# Patient Record
Sex: FEMALE | Race: OTHER | NOT HISPANIC OR LATINO
[De-identification: names, ages, dates, MRNs, and addresses within clinical notes are randomized per-mention and may not be internally consistent; named-entity substitution may affect disease eponyms.]

---

## 2017-01-27 ENCOUNTER — APPOINTMENT (OUTPATIENT)
Dept: INTERNAL MEDICINE | Facility: CLINIC | Age: 42
End: 2017-01-27

## 2017-04-25 ENCOUNTER — APPOINTMENT (OUTPATIENT)
Dept: ORTHOPEDIC SURGERY | Facility: CLINIC | Age: 42
End: 2017-04-25

## 2017-04-25 VITALS — RESPIRATION RATE: 16 BRPM | WEIGHT: 160 LBS | BODY MASS INDEX: 32.25 KG/M2 | HEIGHT: 59 IN

## 2017-04-25 DIAGNOSIS — G56.01 CARPAL TUNNEL SYNDROME, RIGHT UPPER LIMB: ICD-10-CM

## 2017-04-25 DIAGNOSIS — Z78.9 OTHER SPECIFIED HEALTH STATUS: ICD-10-CM

## 2017-04-25 DIAGNOSIS — M19.041 PRIMARY OSTEOARTHRITIS, RIGHT HAND: ICD-10-CM

## 2017-05-02 ENCOUNTER — APPOINTMENT (OUTPATIENT)
Dept: ORTHOPEDIC SURGERY | Facility: CLINIC | Age: 42
End: 2017-05-02

## 2017-05-02 DIAGNOSIS — M79.605 PAIN IN LEFT LEG: ICD-10-CM

## 2017-05-02 DIAGNOSIS — M79.A21 NONTRAUMATIC COMPARTMENT SYNDROME OF RIGHT LOWER EXTREMITY: ICD-10-CM

## 2017-05-02 DIAGNOSIS — M79.604 PAIN IN RIGHT LEG: ICD-10-CM

## 2017-05-02 DIAGNOSIS — M79.A22 NONTRAUMATIC COMPARTMENT SYNDROME OF LEFT LOWER EXTREMITY: ICD-10-CM

## 2017-05-11 ENCOUNTER — FORM ENCOUNTER (OUTPATIENT)
Age: 42
End: 2017-05-11

## 2017-05-12 ENCOUNTER — APPOINTMENT (OUTPATIENT)
Dept: MRI IMAGING | Facility: CLINIC | Age: 42
End: 2017-05-12

## 2017-05-12 ENCOUNTER — OUTPATIENT (OUTPATIENT)
Dept: OUTPATIENT SERVICES | Facility: HOSPITAL | Age: 42
LOS: 1 days | End: 2017-05-12

## 2017-08-31 ENCOUNTER — APPOINTMENT (OUTPATIENT)
Dept: OPHTHALMOLOGY | Facility: CLINIC | Age: 42
End: 2017-08-31
Payer: COMMERCIAL

## 2017-08-31 PROCEDURE — 92004 COMPRE OPH EXAM NEW PT 1/>: CPT

## 2017-09-07 ENCOUNTER — APPOINTMENT (OUTPATIENT)
Dept: OPHTHALMOLOGY | Facility: CLINIC | Age: 42
End: 2017-09-07
Payer: COMMERCIAL

## 2017-09-07 PROCEDURE — 92012 INTRM OPH EXAM EST PATIENT: CPT

## 2017-10-16 ENCOUNTER — APPOINTMENT (OUTPATIENT)
Dept: INTERNAL MEDICINE | Facility: CLINIC | Age: 42
End: 2017-10-16
Payer: COMMERCIAL

## 2017-10-16 VITALS
HEIGHT: 59 IN | BODY MASS INDEX: 33.06 KG/M2 | TEMPERATURE: 97.8 F | DIASTOLIC BLOOD PRESSURE: 70 MMHG | RESPIRATION RATE: 16 BRPM | WEIGHT: 164 LBS | OXYGEN SATURATION: 99 % | HEART RATE: 68 BPM | SYSTOLIC BLOOD PRESSURE: 120 MMHG

## 2017-10-16 DIAGNOSIS — Z82.49 FAMILY HISTORY OF ISCHEMIC HEART DISEASE AND OTHER DISEASES OF THE CIRCULATORY SYSTEM: ICD-10-CM

## 2017-10-16 PROCEDURE — 93000 ELECTROCARDIOGRAM COMPLETE: CPT

## 2017-10-16 PROCEDURE — 99386 PREV VISIT NEW AGE 40-64: CPT | Mod: 25

## 2017-10-16 PROCEDURE — 36415 COLL VENOUS BLD VENIPUNCTURE: CPT

## 2017-10-18 LAB
25(OH)D3 SERPL-MCNC: 19.2 NG/ML
ALBUMIN SERPL ELPH-MCNC: 3.8 G/DL
ALP BLD-CCNC: 45 U/L
ALT SERPL-CCNC: 18 U/L
ANA SER IF-ACNC: NEGATIVE
ANION GAP SERPL CALC-SCNC: 12 MMOL/L
AST SERPL-CCNC: 17 U/L
BASOPHILS # BLD AUTO: 0.02 K/UL
BASOPHILS NFR BLD AUTO: 0.4 %
BILIRUB SERPL-MCNC: 0.3 MG/DL
BUN SERPL-MCNC: 16 MG/DL
CALCIUM SERPL-MCNC: 9.5 MG/DL
CHLORIDE SERPL-SCNC: 101 MMOL/L
CHOLEST SERPL-MCNC: 125 MG/DL
CHOLEST/HDLC SERPL: 3.5 RATIO
CO2 SERPL-SCNC: 27 MMOL/L
CREAT SERPL-MCNC: 0.8 MG/DL
EOSINOPHIL # BLD AUTO: 0.16 K/UL
EOSINOPHIL NFR BLD AUTO: 2.9 %
ERYTHROCYTE [SEDIMENTATION RATE] IN BLOOD BY WESTERGREN METHOD: 16 MM/HR
GLUCOSE SERPL-MCNC: 116 MG/DL
HCT VFR BLD CALC: 37.6 %
HDLC SERPL-MCNC: 36 MG/DL
HGB BLD-MCNC: 12.1 G/DL
IMM GRANULOCYTES NFR BLD AUTO: 0.2 %
LDLC SERPL CALC-MCNC: 64 MG/DL
LYMPHOCYTES # BLD AUTO: 2.45 K/UL
LYMPHOCYTES NFR BLD AUTO: 45 %
MAN DIFF?: NORMAL
MCHC RBC-ENTMCNC: 32.2 GM/DL
MCHC RBC-ENTMCNC: 32.4 PG
MCV RBC AUTO: 100.8 FL
MONOCYTES # BLD AUTO: 0.59 K/UL
MONOCYTES NFR BLD AUTO: 10.8 %
NEUTROPHILS # BLD AUTO: 2.22 K/UL
NEUTROPHILS NFR BLD AUTO: 40.7 %
PLATELET # BLD AUTO: 337 K/UL
POTASSIUM SERPL-SCNC: 4.3 MMOL/L
PROT SERPL-MCNC: 7.7 G/DL
RBC # BLD: 3.73 M/UL
RBC # FLD: 12.4 %
SODIUM SERPL-SCNC: 140 MMOL/L
TRIGL SERPL-MCNC: 126 MG/DL
TSH SERPL-ACNC: 1.11 UIU/ML
WBC # FLD AUTO: 5.45 K/UL

## 2017-10-24 ENCOUNTER — MESSAGE (OUTPATIENT)
Age: 42
End: 2017-10-24

## 2017-10-24 ENCOUNTER — EMERGENCY (EMERGENCY)
Facility: HOSPITAL | Age: 42
LOS: 1 days | Discharge: HOME | End: 2017-10-24
Admitting: EMERGENCY MEDICINE
Payer: COMMERCIAL

## 2017-10-24 VITALS
SYSTOLIC BLOOD PRESSURE: 148 MMHG | WEIGHT: 164.02 LBS | TEMPERATURE: 99 F | OXYGEN SATURATION: 96 % | HEART RATE: 63 BPM | DIASTOLIC BLOOD PRESSURE: 84 MMHG | RESPIRATION RATE: 18 BRPM
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
## 2017-10-24 DIAGNOSIS — S80.862A INSECT BITE (NONVENOMOUS), LEFT LOWER LEG, INITIAL ENCOUNTER: ICD-10-CM
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
## 2017-10-24 PROCEDURE — 99283 EMERGENCY DEPT VISIT LOW MDM: CPT

## 2017-10-24 RX ORDER — CEPHALEXIN 500 MG
1 CAPSULE ORAL
Qty: 14 | Refills: 0
Start: 2017-10-24 | End: 2017-10-31

## 2017-10-24 NOTE — ED PROVIDER NOTE - OBJECTIVE STATEMENT
41 y/o female c/o an insect bite x2 days. Pt states she felt something stung her leg at night and experienced itching. Bite is located on the leg calf. She scratched it and now notes blistering at the site. She denies: recent camping, joint aches, fever, chills, loss of sensation of her legs.

## 2017-10-24 NOTE — ED PROVIDER NOTE - CARE PLAN
Principal Discharge DX:	Insect bite  Instructions for follow-up, activity and diet:	Please take medication as directed and return to ED if you are experiencing fever

## 2017-10-24 NOTE — ED ADULT NURSE NOTE - OBJECTIVE STATEMENT
Patient to ED alert and orientedx3, complaining of insect bite sustained to left posterior tibial region yesterday. "I felt the bug bite immediately and went to see what it was, but nothing was there. It immediately started itching and then turned red." Fluid-filled vesicle noted with redness. Patient denies fever, chills. Pending medical examination. No "bulls-eye" noted; patient denies hiking or outdoors sports recently.

## 2017-10-24 NOTE — ED ADULT TRIAGE NOTE - CHIEF COMPLAINT QUOTE
Pt states she thinks a bug bit her on her L lower leg on Sunday, now having increased redness and swelling to area

## 2017-10-24 NOTE — ED PROVIDER NOTE - MEDICAL DECISION MAKING DETAILS
41 y/o female with bug bit on left lower leg. 2 cm nondemarcated erythematous with blistering. No itching or pain. Pt treated with keflex for possible skin infection. Will return to ED if progressing symptoms.

## 2017-10-24 NOTE — ED PROVIDER NOTE - PHYSICAL EXAMINATION
General: alert and oriented responding appropriately to questions  Cardiac: S1+S2 with no rubs  Lungs: CTA b/l with no wheezing, rales, ronchi, no calf pain  Abdomen: NT/NT, BS x4, no CVAT  Skin: 2.0cm erythema non-demarcated with 2 blisters in center

## 2018-07-24 ENCOUNTER — APPOINTMENT (OUTPATIENT)
Dept: OPHTHALMOLOGY | Facility: CLINIC | Age: 43
End: 2018-07-24
Payer: COMMERCIAL

## 2018-07-24 DIAGNOSIS — H04.123 DRY EYE SYNDROME OF BILATERAL LACRIMAL GLANDS: ICD-10-CM

## 2018-07-24 DIAGNOSIS — S05.01XA INJURY OF CONJUNCTIVA AND CORNEAL ABRASION W/OUT FOREIGN BODY, RIGHT EYE, INITIAL ENCOUNTER: ICD-10-CM

## 2018-07-24 DIAGNOSIS — H15.102 UNSPECIFIED EPISCLERITIS, LEFT EYE: ICD-10-CM

## 2018-07-24 PROCEDURE — 92012 INTRM OPH EXAM EST PATIENT: CPT

## 2018-08-22 ENCOUNTER — APPOINTMENT (OUTPATIENT)
Dept: OPHTHALMOLOGY | Facility: CLINIC | Age: 43
End: 2018-08-22
Payer: COMMERCIAL

## 2018-08-22 DIAGNOSIS — Z98.890 OTHER SPECIFIED POSTPROCEDURAL STATES: ICD-10-CM

## 2018-08-22 DIAGNOSIS — H15.103 UNSPECIFIED EPISCLERITIS, BILATERAL: ICD-10-CM

## 2018-08-22 PROCEDURE — 92014 COMPRE OPH EXAM EST PT 1/>: CPT

## 2018-09-04 ENCOUNTER — TRANSCRIPTION ENCOUNTER (OUTPATIENT)
Age: 43
End: 2018-09-04

## 2018-09-21 ENCOUNTER — OUTPATIENT (OUTPATIENT)
Dept: OUTPATIENT SERVICES | Facility: HOSPITAL | Age: 43
LOS: 1 days | End: 2018-09-21
Payer: COMMERCIAL

## 2018-09-21 ENCOUNTER — APPOINTMENT (OUTPATIENT)
Dept: SURGERY | Facility: CLINIC | Age: 43
End: 2018-09-21

## 2018-09-21 DIAGNOSIS — Z00.00 ENCOUNTER FOR GENERAL ADULT MEDICAL EXAMINATION WITHOUT ABNORMAL FINDINGS: ICD-10-CM

## 2018-09-21 PROCEDURE — 71046 X-RAY EXAM CHEST 2 VIEWS: CPT | Mod: 26

## 2018-10-31 ENCOUNTER — APPOINTMENT (OUTPATIENT)
Dept: OPHTHALMOLOGY | Facility: CLINIC | Age: 43
End: 2018-10-31

## 2018-12-03 ENCOUNTER — APPOINTMENT (OUTPATIENT)
Dept: ORTHOPEDIC SURGERY | Facility: CLINIC | Age: 43
End: 2018-12-03

## 2019-03-04 ENCOUNTER — TRANSCRIPTION ENCOUNTER (OUTPATIENT)
Age: 44
End: 2019-03-04

## 2019-08-07 ENCOUNTER — FORM ENCOUNTER (OUTPATIENT)
Age: 44
End: 2019-08-07

## 2019-08-08 ENCOUNTER — OUTPATIENT (OUTPATIENT)
Dept: OUTPATIENT SERVICES | Facility: HOSPITAL | Age: 44
LOS: 1 days | End: 2019-08-08
Payer: COMMERCIAL

## 2019-08-08 PROCEDURE — 72020 X-RAY EXAM OF SPINE 1 VIEW: CPT | Mod: 26

## 2019-08-08 PROCEDURE — 72020 X-RAY EXAM OF SPINE 1 VIEW: CPT

## 2019-08-08 PROCEDURE — 73521 X-RAY EXAM HIPS BI 2 VIEWS: CPT | Mod: 26

## 2019-08-08 PROCEDURE — 73521 X-RAY EXAM HIPS BI 2 VIEWS: CPT

## 2019-08-09 ENCOUNTER — OTHER (OUTPATIENT)
Age: 44
End: 2019-08-09

## 2019-08-09 DIAGNOSIS — M16.0 BILATERAL PRIMARY OSTEOARTHRITIS OF HIP: ICD-10-CM

## 2019-08-22 ENCOUNTER — APPOINTMENT (OUTPATIENT)
Dept: ORTHOPEDIC SURGERY | Facility: CLINIC | Age: 44
End: 2019-08-22
Payer: COMMERCIAL

## 2019-08-22 VITALS — HEIGHT: 59 IN | BODY MASS INDEX: 30.24 KG/M2 | WEIGHT: 150 LBS

## 2019-08-22 DIAGNOSIS — M76.899 OTHER SPECIFIED ENTHESOPATHIES OF UNSPECIFIED LOWER LIMB, EXCLUDING FOOT: ICD-10-CM

## 2019-08-22 PROCEDURE — 99213 OFFICE O/P EST LOW 20 MIN: CPT

## 2019-08-22 RX ORDER — FLUOROMETHOLONE 1 MG/ML
0.1 SOLUTION/ DROPS OPHTHALMIC
Qty: 1 | Refills: 0 | Status: DISCONTINUED | COMMUNITY
Start: 2017-08-31 | End: 2019-08-22

## 2019-08-22 NOTE — END OF VISIT
[FreeTextEntry3] : All medical record entries made by Patience Huerta acting as a scribe for the performing provider (Daryn Guadalupe MD and/or DUSTIN Luna) on 08/22/2019. All entries were dictated to me by the performing medical provider. In signing this record, the medical provider affirms that they have personally performed the history, physical exam, assessment and plan and have also directed, reviewed and agreed to the documentation in the chart.

## 2019-08-22 NOTE — PHYSICAL EXAM
[de-identified] : X-ray imaging of the AP pelvis and bilateral hips done here today demonstrates minimal degenerative changes, mild degeneration of the inferior aspect of the sacroiliac joints bilaterally [de-identified] : Constitutional: Well appearing. No acute distress.\par Mental Status: Alert & oriented to person, place and time. Normal affect.\par Pulmonary: No respiratory distress. Normal chest excursion.\par \par Gait: Normal.\par Ambulatory assist devices: None.\par \par Cervical spine: Skin intact. No visible deformity. Painless active ROM without evident restriction.\par Bilateral upper extremities: Skin intact. No deformity. Painless active ROM without evident restriction.\par Thoracolumbar spine: No deformity. No tenderness. No radicular pain on passive straight leg raise bilaterally.\par \par Pelvis: No pelvic obliquity. No tenderness.\par Leg lengths: Equal.\par \par Right Hip:\par Skin intact. No surgical scars. No erythema. No ecchymosis. No swelling. No deformity. No focal tenderness.\par Mild discomfort with ROM from full extension to 120 degrees of flexion. 30 degrees of internal rotation. 65 degrees of external rotation. 70 degrees of abduction. 15-20 degrees of adduction.\par No crepitation. No instability.\par NO painless. Impingement (FADIR) painless. Stinchfield with very mild discomfort.\par Flexor power 5/5.\par \par Left Hip:\par Skin intact. No surgical scars. No erythema. No ecchymosis. No swelling. No deformity. No focal tenderness.\par Painless ROM from full extension to 120 degrees of flexion. 30 degrees of internal rotation. 60 degrees of external rotation. 70 degrees of abduction. 15 degrees of adduction.\par No crepitation. No instability.\par NO painless. Impingement (FADIR) painless. Stinchfield painless.\par Flexor power 5/5.\par \par Bilateral Knees: Skin intact. No surgical scars. No erythema or ecchymosis. No swelling or effusion. No deformity. Painless and unrestricted range of motion. Central patellar tracking. No crepitation. No instability.\par \par Neurological: Intact distal crude touch sensation. Normal distal motor power.\par Cardiovascular: Palpable dorsalis pedis and posterior tibialis pulses. Brisk capillary refill. No peripheral edema.\par Lymphatics: No peripheral adenopathy appreciated.

## 2019-08-22 NOTE — DISCUSSION/SUMMARY
[de-identified] : Ms. Pantoja is  44 y/o F with bilateral hip pain but minimal degenerative changes on X-ray. We discussed the diagnosis and prognosis of the patient's condition. Non surgical treatment options include physical therapy and low impact exercise, weight loss for those who are overweight, NSAID and analgesic use, joint injections and activity modification including the use of assistive devices. I wrote a prescription for physical therapy. \par Follow up PRN.

## 2019-08-22 NOTE — HISTORY OF PRESENT ILLNESS
[___ mths] : [unfilled] month(s) ago [Intermit.] : ~He/She~ states the symptoms seem to be intermittent [NSAIDs] : relieved by nonsteroidal anti-inflammatory drugs [None] : No exacerbating factors are noted [de-identified] : 43 year old F presents today for initial evaluation of bilateral hip pain that began a few months ago. She reports mild right hip pain and slight left hip pain localized to the side of the hip that occurs when transitioning between sitting and standing. The pain occurs weekly. Patient can walk an unlimited distance unassisted and can use stairs normally. She can sit comfortably in an ordinary chair and can enter public transportation. She takes Aleve for pain PRN.\par Of note, patient reports that she's been trying to lose weight and has lost a few pounds so far.

## 2019-08-29 ENCOUNTER — RESULT REVIEW (OUTPATIENT)
Age: 44
End: 2019-08-29

## 2019-11-26 ENCOUNTER — MEDICATION RENEWAL (OUTPATIENT)
Age: 44
End: 2019-11-26

## 2019-11-26 RX ORDER — FLUOROMETHOLONE 1 MG/ML
0.1 SOLUTION/ DROPS OPHTHALMIC 3 TIMES DAILY
Qty: 1 | Refills: 1 | Status: ACTIVE | COMMUNITY
Start: 2019-11-26 | End: 1900-01-01

## 2020-01-02 ENCOUNTER — RX RENEWAL (OUTPATIENT)
Age: 45
End: 2020-01-02

## 2020-01-03 ENCOUNTER — APPOINTMENT (OUTPATIENT)
Dept: OPHTHALMOLOGY | Facility: CLINIC | Age: 45
End: 2020-01-03
Payer: COMMERCIAL

## 2020-01-03 ENCOUNTER — NON-APPOINTMENT (OUTPATIENT)
Age: 45
End: 2020-01-03

## 2020-01-03 PROCEDURE — 92014 COMPRE OPH EXAM EST PT 1/>: CPT

## 2020-01-22 ENCOUNTER — APPOINTMENT (OUTPATIENT)
Dept: OPHTHALMOLOGY | Facility: CLINIC | Age: 45
End: 2020-01-22
Payer: COMMERCIAL

## 2020-01-22 ENCOUNTER — NON-APPOINTMENT (OUTPATIENT)
Age: 45
End: 2020-01-22

## 2020-01-22 PROCEDURE — 92014 COMPRE OPH EXAM EST PT 1/>: CPT

## 2020-03-10 ENCOUNTER — APPOINTMENT (OUTPATIENT)
Dept: INTERNAL MEDICINE | Facility: CLINIC | Age: 45
End: 2020-03-10

## 2020-04-26 ENCOUNTER — MESSAGE (OUTPATIENT)
Age: 45
End: 2020-04-26

## 2020-05-15 ENCOUNTER — APPOINTMENT (OUTPATIENT)
Dept: DISASTER EMERGENCY | Facility: CLINIC | Age: 45
End: 2020-05-15

## 2020-05-15 LAB
SARS-COV-2 IGG SERPL IA-ACNC: <0.1 INDEX
SARS-COV-2 IGG SERPL QL IA: NEGATIVE

## 2020-10-12 ENCOUNTER — NON-APPOINTMENT (OUTPATIENT)
Age: 45
End: 2020-10-12

## 2020-10-12 ENCOUNTER — APPOINTMENT (OUTPATIENT)
Dept: OPHTHALMOLOGY | Facility: CLINIC | Age: 45
End: 2020-10-12
Payer: COMMERCIAL

## 2020-10-12 PROCEDURE — 92012 INTRM OPH EXAM EST PATIENT: CPT

## 2020-11-04 ENCOUNTER — APPOINTMENT (OUTPATIENT)
Dept: OBGYN | Facility: CLINIC | Age: 45
End: 2020-11-04
Payer: COMMERCIAL

## 2020-11-04 ENCOUNTER — TRANSCRIPTION ENCOUNTER (OUTPATIENT)
Age: 45
End: 2020-11-04

## 2020-11-04 VITALS — WEIGHT: 165.31 LBS | HEIGHT: 59 IN | BODY MASS INDEX: 33.32 KG/M2

## 2020-11-04 DIAGNOSIS — Z12.4 ENCOUNTER FOR SCREENING FOR MALIGNANT NEOPLASM OF CERVIX: ICD-10-CM

## 2020-11-04 DIAGNOSIS — R23.2 FLUSHING: ICD-10-CM

## 2020-11-04 DIAGNOSIS — R92.2 INCONCLUSIVE MAMMOGRAM: ICD-10-CM

## 2020-11-04 PROCEDURE — 99396 PREV VISIT EST AGE 40-64: CPT

## 2020-11-04 PROCEDURE — 99072 ADDL SUPL MATRL&STAF TM PHE: CPT

## 2020-11-04 RX ORDER — LIDOCAINE AND PRILOCAINE 25; 25 MG/G; MG/G
2.5-2.5 CREAM TOPICAL
Qty: 1 | Refills: 0 | Status: DISCONTINUED | COMMUNITY
Start: 2020-01-02 | End: 2020-11-04

## 2020-11-05 ENCOUNTER — TRANSCRIPTION ENCOUNTER (OUTPATIENT)
Age: 45
End: 2020-11-05

## 2020-11-05 LAB — TSH SERPL-ACNC: 0.91 UIU/ML

## 2020-11-05 RX ORDER — PREDNISOLONE ACETATE 10 MG/ML
1 SUSPENSION/ DROPS OPHTHALMIC
Qty: 5 | Refills: 0 | Status: ACTIVE | COMMUNITY
Start: 2020-10-12

## 2020-11-05 NOTE — PLAN
[FreeTextEntry1] : Annual gyn care\par pap with hpv\par ordered breast ultrasound and mammogram for screening given breast denseness \par \par Hot flashes - will check TSH\par \par Answered questions\par \par Maxine Schultz MD, PhD\par

## 2020-11-05 NOTE — HISTORY OF PRESENT ILLNESS
[Patient reported mammogram was normal] : Patient reported mammogram was normal [Patient reported PAP Smear was normal] : Patient reported PAP Smear was normal [N] : Patient reports normal menses [Condoms] : uses condoms [Y] : Patient is sexually active [TextBox_4] : Ms Pantoja is a 44 yo  who presents for annual gynecological care\par \par Reviewed medical, surgical and family history\par \par History of HPV and colposcopy about 2 years ago - most recent pap test was normal\par Menses regular - with No IMB\par sexually active using condoms\par \par only complaint -- always feels warmer than everyone else\par No change in bowel or bladder habits [Mammogramdate] : 2019 [TextBox_19] : had recommended 6 mo f/up for dense breasts - but did not do [PapSmeardate] : 2019 [TextBox_31] : history of HPV/colposcopy 2017 [LMPDate] : 10/11/20 [MensesFreq] : monthly [MensesLength] : 5-6 [MensesAmount] : moderate [PGxTotal] : 1 [[facility] CenterxFulerm] : 1 [PGHxPremature] : 0 [PGHxAbortions] : 0 [[facility] Centeriving] : 1 [FreeTextEntry1] : c-sxblaine

## 2020-11-05 NOTE — REVIEW OF SYSTEMS
[Fever] : no fever [Fatigue] : no fatigue [Night Sweats] : night sweats [Dry Eyes] : no dry eyes [Eye Discharge] : no eye discharge [Sight Problems] : no sight problems [Dyspnea] : no dyspnea [Cough] : no cough [SOB on Exertion] : no shortness of breath on exertion [Chest Pain] : no chest pain [Palpitations] : no palpitations [Abdominal Pain] : no abdominal pain [Constipation] : no constipation [Diarrhea] : diarrhea [Urgency] : no urgency [Frequency] : no frequency [Incontinence] : no incontinence [Dysuria] : no dysuria [Urethral Discharge] : no urethral discharge [Abn Vaginal bleeding] : no abnormal vaginal bleeding [Pelvic pain] : no pelvic pain [CVA Pain] : no CVA pain [Genital Rash/Irritation] : no genital rash/irritation [Skin Rash] : no skin rash [Mole Changes] : no mole changes [Joint Stiffness] : no joint stiffness [Joint Swelling] : no joint swelling [Headache] : no headache [Dizziness] : no dizziness [Anxiety] : no anxiety [Depression] : no depression [Deepening Voice] : no deepening voice [Feeling Weak] : not feeling weak [Hot Flashes] : hot flashes [Easy Bleeding] : no easy bleeding [Easy Bruising] : no easy bruising [Negative] : Heme/Lymph

## 2020-11-05 NOTE — PHYSICAL EXAM
[Appropriately responsive] : appropriately responsive [Alert] : alert [No Acute Distress] : no acute distress [No Lymphadenopathy] : no lymphadenopathy [Soft] : soft [Non-tender] : non-tender [Non-distended] : non-distended [No HSM] : No HSM [No Mass] : no mass [Oriented x3] : oriented x3 [FreeTextEntry3] : no masses/nodules [FreeTextEntry6] : Examined sitting and recumbent - scattered dense breast tissue [Examination Of The Breasts] : a normal appearance [Breast Palpation Diffuse Fibrous Tissue Bilateral] : fibrocystic changes [No Discharge] : no discharge [No Masses] : no breast masses were palpable [No Lesions] : no lesions  [Labia Majora] : normal [Labia Minora] : normal [Pink Rugae] : pink rugae [No Bleeding] : There was no active vaginal bleeding [Normal] : normal [Normal Position] : in a normal position [Tenderness] : nontender [Enlarged ___ wks] : not enlarged [Mass ___ cm] : no uterine mass was palpated [Uterine Adnexae] : normal [FreeTextEntry5] : no masses/polyps/cmt [FreeTextEntry9] : deferred [FreeTextEntry8] : no pelvic masses

## 2020-11-06 LAB — HPV HIGH+LOW RISK DNA PNL CVX: NOT DETECTED

## 2020-11-09 ENCOUNTER — TRANSCRIPTION ENCOUNTER (OUTPATIENT)
Age: 45
End: 2020-11-09

## 2020-11-09 LAB — CYTOLOGY CVX/VAG DOC THIN PREP: NORMAL

## 2020-11-12 ENCOUNTER — APPOINTMENT (OUTPATIENT)
Dept: OPHTHALMOLOGY | Facility: CLINIC | Age: 45
End: 2020-11-12
Payer: COMMERCIAL

## 2020-11-12 ENCOUNTER — NON-APPOINTMENT (OUTPATIENT)
Age: 45
End: 2020-11-12

## 2020-11-12 PROCEDURE — 92012 INTRM OPH EXAM EST PATIENT: CPT

## 2020-11-12 PROCEDURE — 99072 ADDL SUPL MATRL&STAF TM PHE: CPT

## 2020-12-01 ENCOUNTER — APPOINTMENT (OUTPATIENT)
Dept: INTERNAL MEDICINE | Facility: CLINIC | Age: 45
End: 2020-12-01

## 2021-01-18 ENCOUNTER — EMERGENCY (EMERGENCY)
Facility: HOSPITAL | Age: 46
LOS: 1 days | Discharge: HOME | End: 2021-01-18
Attending: EMERGENCY MEDICINE | Admitting: EMERGENCY MEDICINE
Payer: COMMERCIAL

## 2021-01-18 VITALS
SYSTOLIC BLOOD PRESSURE: 133 MMHG | TEMPERATURE: 101 F | DIASTOLIC BLOOD PRESSURE: 83 MMHG | HEART RATE: 109 BPM | OXYGEN SATURATION: 99 % | RESPIRATION RATE: 18 BRPM

## 2021-01-18 VITALS
TEMPERATURE: 100 F | SYSTOLIC BLOOD PRESSURE: 120 MMHG | DIASTOLIC BLOOD PRESSURE: 74 MMHG | RESPIRATION RATE: 16 BRPM | HEART RATE: 90 BPM | OXYGEN SATURATION: 100 %

## 2021-01-18 DIAGNOSIS — Z20.822 CONTACT WITH AND (SUSPECTED) EXPOSURE TO COVID-19: ICD-10-CM

## 2021-01-18 DIAGNOSIS — M79.18 MYALGIA, OTHER SITE: ICD-10-CM

## 2021-01-18 DIAGNOSIS — R50.9 FEVER, UNSPECIFIED: ICD-10-CM

## 2021-01-18 DIAGNOSIS — R09.81 NASAL CONGESTION: ICD-10-CM

## 2021-01-18 DIAGNOSIS — J02.9 ACUTE PHARYNGITIS, UNSPECIFIED: ICD-10-CM

## 2021-01-18 LAB
S PYO AG SPEC QL IA: POSITIVE
SARS-COV-2 RNA SPEC QL NAA+PROBE: SIGNIFICANT CHANGE UP

## 2021-01-18 PROCEDURE — U0003: CPT

## 2021-01-18 PROCEDURE — 99284 EMERGENCY DEPT VISIT MOD MDM: CPT

## 2021-01-18 PROCEDURE — 87880 STREP A ASSAY W/OPTIC: CPT

## 2021-01-18 PROCEDURE — 99283 EMERGENCY DEPT VISIT LOW MDM: CPT

## 2021-01-18 PROCEDURE — U0005: CPT

## 2021-01-18 RX ORDER — AMOXICILLIN 250 MG/5ML
1 SUSPENSION, RECONSTITUTED, ORAL (ML) ORAL
Qty: 20 | Refills: 0
Start: 2021-01-18 | End: 2021-01-27

## 2021-01-18 RX ORDER — ACETAMINOPHEN 500 MG
975 TABLET ORAL ONCE
Refills: 0 | Status: COMPLETED | OUTPATIENT
Start: 2021-01-18 | End: 2021-01-18

## 2021-01-18 RX ADMIN — Medication 975 MILLIGRAM(S): at 07:58

## 2021-01-18 NOTE — ED PROVIDER NOTE - OBJECTIVE STATEMENT
50 yo f with no pmh c/o sore throat, nasal congestion, bodyaches x 3 days. Associated with subjective fever and chills. Denies ha, dizziness, n/v/d, cough, cp, sob, anosmia. Denies sick contacts or recent travel.

## 2021-01-18 NOTE — ED PROVIDER NOTE - ADDITIONAL RISK FACTOR FREE TEXT BOX
50 yo f with no pmh c/o sore throat, nasal congestion, bodyaches x 3 days. Associated with subjective fever and chills. Denies ha, dizziness, n/v/d, cough, cp, sob, anosmia. Denies sick contacts or recent travel. +pharyngeal erythema, no exudates, uvula midline. +rapid strep. 52 yo f with no pmh c/o sore throat, nasal  congestion, bodyaches x 3 days. Associated with subjective fever and chills. Denies ha, dizziness, n/v/d, cough, cp, sob, anosmia. Denies sick contacts or recent travel. +pharyngeal erythema, no exudates, uvula midline. +rapid strep. 52 yo f with no pmh c/o sore throat, nasal  congestion, bodyaches x 3 days. Associated with subjective fever and chills. Denies ha, dizziness, n/v/d, cough, cp, sob, anosmia. Denies sick contacts or recent travel. +pharyngeal erythema, no exudates, uvula midline. +rapid strep. dc with abx

## 2021-01-18 NOTE — ED PROVIDER NOTE - ATTENDING CONTRIBUTION TO CARE
45 , no PMH , sore throat , myalgias, fever , no cough, no CP no SOB, no sick contacts, no covid exposure,  febrile 100.8 , + erythema, no exudates,   + strep positive, pt non toxic  d/c on antibiotics,   also covid swab pending   emergent return instructions were discussed with patient 45 , no PMH , sore throat , myalgias, fever , no cough, no CP no SOB, no sick contacts, no covid exposure,  febrile 100.8 , + erythema, no exudates,   + strep positive, pt non toxic  d/c on antibiotics,   also covid swab pending   emergent return instructions were discussed with  patient

## 2021-01-18 NOTE — ED PROVIDER NOTE - PHYSICAL EXAMINATION
CONSTITUTIONAL: Well-appearing; well-nourished; in no apparent distress.   HEAD: Normocephalic; atraumatic.   EYES: PERRL; EOM intact; conjunctiva and sclera clear  ENT: +pharyngeal erythema, no exudates, uvula midline  NECK: Supple; +tender cervical LAD   CARDIOVASCULAR: Normal S1, S2; no murmurs, rubs, or gallops. Regular rate and rhythm.   RESPIRATORY: Breathing easily; breath sounds clear and equal bilaterally; no wheezes, rhonchi, or rales.  MSK: FROM at all extremities, normal tone   EXT: No cyanosis or edema; N/V intact  SKIN: Normal for age and race; warm; dry; good turgor; no apparent lesions or rash. CONSTITUTIONAL: Well-appearing; well-nourished; in no apparent distress.   HEAD: Normocephalic; atraumatic.   EYES: PERRL; EOM intact; conjunctiva and sclera clear  ENT: +pharyngeal erythema, no exudates, uvula midline  NECK: Supple; +tender cervical LAD   CARDIOVASCULAR: Normal S1, S2; no murmurs, rubs, or gallops. Regular rate and rhythm.   RESPIRATORY: Breathing easily; breath sounds clear and equal bilaterally; no wheezes, rhonchi, or rales.  MSK: FROM at all extremities, normal tone   EXT: No cyanosis or edema; N/V intact  SKIN: Normal for age and race; warm; dry; good turgor; no apparent lesions or rash .

## 2021-01-18 NOTE — ED PROVIDER NOTE - NS ED ROS FT
Constitutional: +fever, chills    All other ROS neg except as per HPI Constitutional: +fever, chills    All other ROS neg except as per  HPI

## 2021-01-18 NOTE — ED PROVIDER NOTE - PATIENT PORTAL LINK FT
You can access the FollowMyHealth Patient Portal offered by [facility] by registering at the following website: [email or web address]. By joining [facility]’s FollowMyHealth portal, you will also be able to view your health information using other applications (apps) compatible with our system.

## 2021-02-11 ENCOUNTER — APPOINTMENT (OUTPATIENT)
Dept: OPHTHALMOLOGY | Facility: CLINIC | Age: 46
End: 2021-02-11

## 2021-03-17 ENCOUNTER — APPOINTMENT (OUTPATIENT)
Dept: ORTHOPEDIC SURGERY | Facility: CLINIC | Age: 46
End: 2021-03-17
Payer: SUBSIDIZED

## 2021-03-17 DIAGNOSIS — Z00.6 ENCOUNTER FOR EXAMINATION FOR NORMAL COMPARISON AND CONTROL IN CLINICAL RESEARCH PROGRAM: ICD-10-CM

## 2021-03-17 PROCEDURE — ZZZZZ: CPT

## 2021-03-24 PROBLEM — Z00.6 RESEARCH SUBJECT: Status: ACTIVE | Noted: 2021-03-24

## 2021-06-16 ENCOUNTER — APPOINTMENT (OUTPATIENT)
Dept: ORTHOPEDIC SURGERY | Facility: CLINIC | Age: 46
End: 2021-06-16
Payer: SUBSIDIZED

## 2021-06-16 PROCEDURE — ZZZZZ: CPT

## 2021-09-15 ENCOUNTER — LABORATORY RESULT (OUTPATIENT)
Age: 46
End: 2021-09-15

## 2021-09-15 ENCOUNTER — APPOINTMENT (OUTPATIENT)
Dept: INTERNAL MEDICINE | Facility: CLINIC | Age: 46
End: 2021-09-15
Payer: COMMERCIAL

## 2021-09-15 VITALS
SYSTOLIC BLOOD PRESSURE: 120 MMHG | WEIGHT: 166 LBS | RESPIRATION RATE: 16 BRPM | OXYGEN SATURATION: 98 % | BODY MASS INDEX: 33.47 KG/M2 | DIASTOLIC BLOOD PRESSURE: 80 MMHG | TEMPERATURE: 98.4 F | HEIGHT: 59 IN | HEART RATE: 62 BPM

## 2021-09-15 PROCEDURE — 36415 COLL VENOUS BLD VENIPUNCTURE: CPT

## 2021-09-15 PROCEDURE — 99386 PREV VISIT NEW AGE 40-64: CPT

## 2021-09-15 PROCEDURE — 93000 ELECTROCARDIOGRAM COMPLETE: CPT

## 2021-09-15 NOTE — PLAN
[FreeTextEntry1] : Wellness complete\par labs today\par ekg NSR\par  encourage more regular exercise\par mammo annually\par  colonoscopy for next year.

## 2021-09-15 NOTE — HISTORY OF PRESENT ILLNESS
[de-identified] : 46 yo f here for wellness\par \par still working at [employer] at [employer]\par gyn annually\par mammo 11/20\par \par menstruating regularly.\par good sleep\par not that active \par recently finished school\par eats a healthy diet.

## 2021-09-16 LAB
ALBUMIN SERPL ELPH-MCNC: 4.3 G/DL
ALP BLD-CCNC: 47 U/L
ALT SERPL-CCNC: 34 U/L
ANION GAP SERPL CALC-SCNC: 12 MMOL/L
AST SERPL-CCNC: 25 U/L
BASOPHILS # BLD AUTO: 0.04 K/UL
BASOPHILS NFR BLD AUTO: 0.7 %
BILIRUB SERPL-MCNC: 0.4 MG/DL
BUN SERPL-MCNC: 18 MG/DL
CALCIUM SERPL-MCNC: 9.2 MG/DL
CHLORIDE SERPL-SCNC: 101 MMOL/L
CHOLEST SERPL-MCNC: 118 MG/DL
CO2 SERPL-SCNC: 24 MMOL/L
CREAT SERPL-MCNC: 0.79 MG/DL
EOSINOPHIL # BLD AUTO: 0.13 K/UL
EOSINOPHIL NFR BLD AUTO: 2.4 %
ESTIMATED AVERAGE GLUCOSE: 120 MG/DL
FSH SERPL-MCNC: 19.3 IU/L
GLUCOSE SERPL-MCNC: 114 MG/DL
HBA1C MFR BLD HPLC: 5.8 %
HCT VFR BLD CALC: 39.4 %
HDLC SERPL-MCNC: 36 MG/DL
HGB BLD-MCNC: 12.5 G/DL
IMM GRANULOCYTES NFR BLD AUTO: 0 %
LDLC SERPL CALC-MCNC: 61 MG/DL
LH SERPL-ACNC: 14.8 IU/L
LYMPHOCYTES # BLD AUTO: 2.61 K/UL
LYMPHOCYTES NFR BLD AUTO: 47.6 %
MAN DIFF?: NORMAL
MCHC RBC-ENTMCNC: 31.7 GM/DL
MCHC RBC-ENTMCNC: 33.6 PG
MCV RBC AUTO: 105.9 FL
MONOCYTES # BLD AUTO: 0.54 K/UL
MONOCYTES NFR BLD AUTO: 9.9 %
NEUTROPHILS # BLD AUTO: 2.16 K/UL
NEUTROPHILS NFR BLD AUTO: 39.4 %
NONHDLC SERPL-MCNC: 81 MG/DL
PLATELET # BLD AUTO: 301 K/UL
POTASSIUM SERPL-SCNC: 4 MMOL/L
PROT SERPL-MCNC: 7.8 G/DL
RBC # BLD: 3.72 M/UL
RBC # FLD: 12.2 %
SODIUM SERPL-SCNC: 137 MMOL/L
TRIGL SERPL-MCNC: 105 MG/DL
TSH SERPL-ACNC: 0.89 UIU/ML
VIT B12 SERPL-MCNC: 466 PG/ML
WBC # FLD AUTO: 5.48 K/UL

## 2021-10-07 ENCOUNTER — NON-APPOINTMENT (OUTPATIENT)
Age: 46
End: 2021-10-07

## 2021-10-13 ENCOUNTER — NON-APPOINTMENT (OUTPATIENT)
Age: 46
End: 2021-10-13

## 2021-10-14 ENCOUNTER — NON-APPOINTMENT (OUTPATIENT)
Age: 46
End: 2021-10-14

## 2021-10-14 VITALS — WEIGHT: 167 LBS | BODY MASS INDEX: 33.73 KG/M2

## 2021-10-15 ENCOUNTER — NON-APPOINTMENT (OUTPATIENT)
Age: 46
End: 2021-10-15

## 2021-11-16 ENCOUNTER — NON-APPOINTMENT (OUTPATIENT)
Age: 46
End: 2021-11-16

## 2021-11-16 VITALS — WEIGHT: 166 LBS | BODY MASS INDEX: 33.53 KG/M2

## 2021-12-14 ENCOUNTER — NON-APPOINTMENT (OUTPATIENT)
Age: 46
End: 2021-12-14

## 2021-12-14 VITALS — WEIGHT: 163 LBS | BODY MASS INDEX: 32.92 KG/M2

## 2022-01-19 LAB
ANION GAP SERPL CALC-SCNC: 12 MMOL/L
BUN SERPL-MCNC: 12 MG/DL
CALCIUM SERPL-MCNC: 9.3 MG/DL
CHLORIDE SERPL-SCNC: 102 MMOL/L
CO2 SERPL-SCNC: 26 MMOL/L
CREAT SERPL-MCNC: 0.74 MG/DL
ESTIMATED AVERAGE GLUCOSE: 114 MG/DL
GLUCOSE SERPL-MCNC: 116 MG/DL
HBA1C MFR BLD HPLC: 5.6 %
POTASSIUM SERPL-SCNC: 4.2 MMOL/L
SODIUM SERPL-SCNC: 140 MMOL/L

## 2022-08-10 ENCOUNTER — APPOINTMENT (OUTPATIENT)
Dept: ORTHOPEDIC SURGERY | Facility: CLINIC | Age: 47
End: 2022-08-10

## 2022-08-10 VITALS — HEIGHT: 59 IN | RESPIRATION RATE: 16 BRPM | BODY MASS INDEX: 30.24 KG/M2 | WEIGHT: 150 LBS

## 2022-08-10 DIAGNOSIS — M77.11 LATERAL EPICONDYLITIS, RIGHT ELBOW: ICD-10-CM

## 2022-08-10 PROCEDURE — 99203 OFFICE O/P NEW LOW 30 MIN: CPT

## 2022-09-15 ENCOUNTER — APPOINTMENT (OUTPATIENT)
Dept: INTERNAL MEDICINE | Facility: CLINIC | Age: 47
End: 2022-09-15

## 2022-09-23 ENCOUNTER — APPOINTMENT (OUTPATIENT)
Dept: INTERNAL MEDICINE | Facility: CLINIC | Age: 47
End: 2022-09-23

## 2022-10-13 ENCOUNTER — NON-APPOINTMENT (OUTPATIENT)
Age: 47
End: 2022-10-13

## 2022-10-13 ENCOUNTER — APPOINTMENT (OUTPATIENT)
Dept: OBGYN | Facility: CLINIC | Age: 47
End: 2022-10-13

## 2022-10-13 VITALS
WEIGHT: 144 LBS | HEIGHT: 59 IN | DIASTOLIC BLOOD PRESSURE: 79 MMHG | SYSTOLIC BLOOD PRESSURE: 113 MMHG | BODY MASS INDEX: 29.03 KG/M2

## 2022-10-13 PROCEDURE — 99396 PREV VISIT EST AGE 40-64: CPT

## 2022-10-13 NOTE — PHYSICAL EXAM
[Chaperone Present] : A chaperone was present in the examining room during all aspects of the physical examination [Appropriately responsive] : appropriately responsive [Alert] : alert [No Acute Distress] : no acute distress [Regular Rate Rhythm] : regular rate rhythm [No Murmurs] : no murmurs [Clear to Auscultation B/L] : clear to auscultation bilaterally [Soft] : soft [Non-tender] : non-tender [Non-distended] : non-distended [No Mass] : no mass [Oriented x3] : oriented x3 [Examination Of The Breasts] : a normal appearance [No Masses] : no breast masses were palpable [Labia Majora] : normal [Labia Minora] : normal [Normal] : normal [Uterine Adnexae] : normal

## 2022-10-13 NOTE — HISTORY OF PRESENT ILLNESS
[N] : Patient is not sexually active [FreeTextEntry1] : 48 yo  who presents for [employer].\par \par Pt here for WWE. Doing well, no complaints. 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 She is not SA, had one prior CD. Overdue for MMG and pap. Desires hormonal testing to see if close to menopause.  [Mammogramdate] : 12/2020 [PapSmeardate] : 12/2020 [LMPDate] : 09/04/2022 [MensesFreq] : 28 [MensesLength] : 5 [PGxTotal] : 1 [[facility] Hospitaliving] : 1

## 2022-10-13 NOTE — PLAN
[FreeTextEntry1] : 46 yo  who presents for [employer].\par \par - Normal breast and pelvic exam\par - Pap collected\par - Pt desires STI screening\par - Hormone panel, discussed perimenopause\par - MMG and breast sono Rx\par \par RTO in 1 year or PRN.\par \par Vicky Carpenter MD

## 2022-10-13 NOTE — COUNSELING
[Bladder Hygiene] : bladder hygiene [Lab Results] : lab results [Medication Management] : medication management

## 2022-10-14 ENCOUNTER — TRANSCRIPTION ENCOUNTER (OUTPATIENT)
Age: 47
End: 2022-10-14

## 2022-10-14 LAB
C TRACH RRNA SPEC QL NAA+PROBE: NOT DETECTED
FSH SERPL-MCNC: 10.3 IU/L
HBV SURFACE AG SER QL: NONREACTIVE
HIV1+2 AB SPEC QL IA.RAPID: NONREACTIVE
HPV HIGH+LOW RISK DNA PNL CVX: NOT DETECTED
N GONORRHOEA RRNA SPEC QL NAA+PROBE: NOT DETECTED
SOURCE AMPLIFICATION: NORMAL
T PALLIDUM AB SER QL IA: NEGATIVE
TSH SERPL-ACNC: 0.51 UIU/ML

## 2022-10-19 ENCOUNTER — TRANSCRIPTION ENCOUNTER (OUTPATIENT)
Age: 47
End: 2022-10-19

## 2022-10-19 LAB — CYTOLOGY CVX/VAG DOC THIN PREP: NORMAL

## 2022-10-21 ENCOUNTER — TRANSCRIPTION ENCOUNTER (OUTPATIENT)
Age: 47
End: 2022-10-21

## 2022-10-21 LAB — ANTI-MUELLERIAN HORMONE: 0.02 NG/ML

## 2022-10-31 ENCOUNTER — OUTPATIENT (OUTPATIENT)
Dept: OUTPATIENT SERVICES | Facility: HOSPITAL | Age: 47
LOS: 1 days | End: 2022-10-31
Payer: COMMERCIAL

## 2022-10-31 ENCOUNTER — TRANSCRIPTION ENCOUNTER (OUTPATIENT)
Age: 47
End: 2022-10-31

## 2022-10-31 ENCOUNTER — RESULT REVIEW (OUTPATIENT)
Age: 47
End: 2022-10-31

## 2022-10-31 ENCOUNTER — APPOINTMENT (OUTPATIENT)
Dept: MAMMOGRAPHY | Facility: HOSPITAL | Age: 47
End: 2022-10-31

## 2022-10-31 ENCOUNTER — APPOINTMENT (OUTPATIENT)
Dept: ULTRASOUND IMAGING | Facility: HOSPITAL | Age: 47
End: 2022-10-31

## 2022-10-31 PROCEDURE — 76641 ULTRASOUND BREAST COMPLETE: CPT | Mod: 26,50

## 2022-10-31 PROCEDURE — 77067 SCR MAMMO BI INCL CAD: CPT

## 2022-10-31 PROCEDURE — 77063 BREAST TOMOSYNTHESIS BI: CPT

## 2022-10-31 PROCEDURE — 76641 ULTRASOUND BREAST COMPLETE: CPT

## 2022-10-31 PROCEDURE — 77067 SCR MAMMO BI INCL CAD: CPT | Mod: 26

## 2022-10-31 PROCEDURE — 77063 BREAST TOMOSYNTHESIS BI: CPT | Mod: 26

## 2022-12-25 NOTE — ED ADULT TRIAGE NOTE - NS ED NURSE DIRECT TO ROOM YN
Physician Documentation                                                                           

 [facility]                                                                 

Name: Johnny Davila                                                                             

Age: 40 yrs                                                                                       

Sex: Male                                                                                         

: 1982                                                                                   

MRN: [id number]                                                                                   

Arrival Date: 2022                                                                          

Time: 14:30                                                                                       

Account#: [id number]                                                                            

Bed 12                                                                                            

Private MD:                                                                                       

ED Physician Prakash Villalta                                                                      

HPI:                                                                                              

                                                                                             

17:01 This 40 yrs old Male presents to ER via Ambulatory with complaints of Flu Symptoms.     kb  

17:01 The patient or guardian reports cough, that is intermittent, described as mild, flu     kb  

      symptoms, low-grade fever, myalgias, no appetite. Onset: The symptoms/episode               

      began/occurred this morning, at 09:00. Severity of symptoms: At their worst the             

      symptoms were moderate, in the emergency department the symptoms are unchanged.             

      Modifying factors: The symptoms are alleviated by nothing, the symptoms are aggravated      

      by nothing. Associated signs and symptoms: Pertinent positives: fever. The patient has      

      not experienced similar symptoms in the past. The patient has not recently seen a           

      physician. Pr reports malaise, cough, congestion, fever, chills, bodyaches and headache     

      that started at 0900 this morning.                                                          

                                                                                                  

Historical:                                                                                       

- Allergies:                                                                                      

14:50 No Known Allergies;                                                                     hb  

- PMHx:                                                                                           

14:50 Josiane Dx; Depression;                                                              hb  

- PSHx:                                                                                           

14:50 Carpel Tunnel;                                                                          hb  

                                                                                                  

- Immunization history:: Adult Immunizations up to date.                                          

- Social history:: Smoking status: Patient denies any tobacco usage or history of.                

                                                                                                  

                                                                                                  

ROS:                                                                                              

17:02 Abdomen/GI: Negative for abdominal pain, nausea, vomiting, diarrhea, and constipation.  kb  

17:02 Constitutional: Positive for body aches, chills, fatigue, fever, malaise.                   

17:02 ENT: Positive for sinus congestion.                                                         

17:02 Respiratory: Positive for cough.                                                            

17:02 Neuro: Positive for headache.                                                               

17:02 All other systems are negative.                                                             

                                                                                                  

Exam:                                                                                             

17:02 Constitutional:  This is a well developed, well nourished patient who is awake, alert,  kb  

      and in no acute distress. Head/Face:  Normocephalic, atraumatic. ENT:  Moist Mucous         

      membranes Cardiovascular:  Regular rate and rhythm with a normal S1 and S2.  No             

      gallops, murmurs, or rubs.  No pulse deficits. Respiratory:  Respirations even and          

      unlabored. No increased work of breathing. Talking in full sentences Abdomen/GI:  Soft,     

      non-tender. No distention Skin:  Warm, dry with normal turgor.  Normal color. MS/           

      Extremity:  Pulses equal, no cyanosis.  Neurovascular intact.  Full, normal range of        

      motion. Neuro:  Awake and alert, GCS 15, oriented to person, place, time, and               

      situation. Moves all extremities. Normal gait. Psych:  Awake, alert, with orientation       

      to person, place and time.  Behavior, mood, and affect are within normal limits.            

                                                                                                  

Vital Signs:                                                                                      

14:50  / 79; Pulse 120; Resp 18; Temp 98.4(TE); Pulse Ox 98% ; Weight 86.18 kg; Height  hb  

      5 ft. 6 in. (167.64 cm); Pain 10/10;                                                        

17:08 Pulse 100; Resp 19 S; Temp 99.1(O); Pulse Ox 100% on R/A;                               aa5 

14:50 Body Mass Index 30.67 (86.18 kg, 167.64 cm)                                             hb  

                                                                                                  

MDM:                                                                                              

15:01 Patient medically screened.                                                             kb  

17:00 Data reviewed: vital signs, nurses notes. Data interpreted: Pulse oximetry: on room air kb  

      is 98 %. Interpretation: normal. Counseling: I had a detailed discussion with the           

      patient and/or guardian regarding: the historical points, exam findings, and any            

      diagnostic results supporting the discharge/admit diagnosis, lab results, the need for      

      outpatient follow up, a family practitioner, to return to the emergency department if       

      symptoms worsen or persist or if there are any questions or concerns that arise at home.    

                                                                                                  

                                                                                             

14:51 Order name: COVID-19/FLU A+B; Complete Time: 15:51                                      kb  

                                                                                                  

Administered Medications:                                                                         

No medications were administered                                                                  

                                                                                                  

                                                                                                  

Disposition Summary:                                                                              

22 16:56                                                                                    

Discharge Ordered                                                                                 

      Location: Home                                                                          kb  

      Condition: Stable                                                                       kb  

      Diagnosis                                                                                   

        - Acute upper respiratory infection, unspecified                                      kb  

      Followup:                                                                               kb  

        - With: Emergency Department                                                               

        - When: As needed                                                                          

        - Reason: Worsening of condition                                                           

      Followup:                                                                               kb  

        - With: Private Physician                                                                  

        - When: 2 - 3 days                                                                         

        - Reason: Recheck today's complaints, Continuance of care, Re-evaluation by your           

      physician                                                                                   

      Discharge Instructions:                                                                     

        - Discharge Summary Sheet                                                             kb  

        - Upper Respiratory Infection, Adult, Easy-to-Read                                    kb  

        - Viral Respiratory Infection, Easy-To-Read                                           kb  

      Forms:                                                                                      

        - Medication Reconciliation Form                                                      kb  

        - Thank You Letter                                                                    kb  

        - Antibiotic Education                                                                kb  

        - Prescription Opioid Use                                                             kb  

Signatures:                                                                                       

Dispatcher [employer]                           Diana Cardenas, FNP-C                 FNP-Ckb                                                   

Freya Tavares, RN                     RN   hb                                                   

                                                                                                  

**************************************************************************************************
No

## 2023-06-22 ENCOUNTER — APPOINTMENT (OUTPATIENT)
Dept: INTERNAL MEDICINE | Facility: CLINIC | Age: 48
End: 2023-06-22

## 2023-11-01 ENCOUNTER — APPOINTMENT (OUTPATIENT)
Dept: OBGYN | Facility: CLINIC | Age: 48
End: 2023-11-01
Payer: COMMERCIAL

## 2023-11-01 VITALS
WEIGHT: 150 LBS | OXYGEN SATURATION: 96 % | SYSTOLIC BLOOD PRESSURE: 145 MMHG | BODY MASS INDEX: 30.3 KG/M2 | HEART RATE: 62 BPM | DIASTOLIC BLOOD PRESSURE: 81 MMHG

## 2023-11-01 DIAGNOSIS — Z01.419 ENCOUNTER FOR GYNECOLOGICAL EXAMINATION (GENERAL) (ROUTINE) W/OUT ABNORMAL FINDINGS: ICD-10-CM

## 2023-11-01 PROCEDURE — 99396 PREV VISIT EST AGE 40-64: CPT

## 2023-11-02 ENCOUNTER — TRANSCRIPTION ENCOUNTER (OUTPATIENT)
Age: 48
End: 2023-11-02

## 2023-11-02 LAB
HBV SURFACE AG SER QL: NONREACTIVE
HIV1+2 AB SPEC QL IA.RAPID: NONREACTIVE
T PALLIDUM AB SER QL IA: NEGATIVE

## 2023-11-06 ENCOUNTER — TRANSCRIPTION ENCOUNTER (OUTPATIENT)
Age: 48
End: 2023-11-06

## 2023-11-06 LAB
A VAGINAE DNA VAG QL NAA+PROBE: ABNORMAL
BVAB2 DNA VAG QL NAA+PROBE: NORMAL
C KRUSEI DNA VAG QL NAA+PROBE: NEGATIVE
C KRUSEI DNA VAG QL NAA+PROBE: POSITIVE
C TRACH RRNA SPEC QL NAA+PROBE: NEGATIVE
CANDIDA DNA VAG QL NAA+PROBE: NEGATIVE
CYTOLOGY CVX/VAG DOC THIN PREP: NORMAL
HPV HIGH+LOW RISK DNA PNL CVX: NOT DETECTED
MEGA1 DNA VAG QL NAA+PROBE: NORMAL
N GONORRHOEA RRNA SPEC QL NAA+PROBE: NEGATIVE
T VAGINALIS RRNA SPEC QL NAA+PROBE: NEGATIVE

## 2023-11-08 ENCOUNTER — TRANSCRIPTION ENCOUNTER (OUTPATIENT)
Age: 48
End: 2023-11-08

## 2024-01-23 ENCOUNTER — NON-APPOINTMENT (OUTPATIENT)
Age: 49
End: 2024-01-23

## 2024-01-24 ENCOUNTER — TRANSCRIPTION ENCOUNTER (OUTPATIENT)
Age: 49
End: 2024-01-24

## 2024-01-29 ENCOUNTER — APPOINTMENT (OUTPATIENT)
Dept: OBGYN | Facility: CLINIC | Age: 49
End: 2024-01-29
Payer: COMMERCIAL

## 2024-01-29 PROCEDURE — 36415 COLL VENOUS BLD VENIPUNCTURE: CPT

## 2024-01-30 LAB
FSH SERPL-MCNC: 9.1 IU/L
LH SERPL-ACNC: 15.4 IU/L
TSH SERPL-ACNC: 0.87 UIU/ML

## 2024-02-08 ENCOUNTER — APPOINTMENT (OUTPATIENT)
Dept: FAMILY MEDICINE | Facility: CLINIC | Age: 49
End: 2024-02-08

## 2024-02-14 ENCOUNTER — TRANSCRIPTION ENCOUNTER (OUTPATIENT)
Age: 49
End: 2024-02-14

## 2024-02-28 ENCOUNTER — APPOINTMENT (OUTPATIENT)
Dept: INTERNAL MEDICINE | Facility: CLINIC | Age: 49
End: 2024-02-28
Payer: COMMERCIAL

## 2024-02-28 VITALS
TEMPERATURE: 96.3 F | DIASTOLIC BLOOD PRESSURE: 84 MMHG | SYSTOLIC BLOOD PRESSURE: 127 MMHG | HEART RATE: 55 BPM | OXYGEN SATURATION: 100 % | BODY MASS INDEX: 30.24 KG/M2 | HEIGHT: 59 IN | WEIGHT: 150 LBS

## 2024-02-28 DIAGNOSIS — Z12.39 ENCOUNTER FOR OTHER SCREENING FOR MALIGNANT NEOPLASM OF BREAST: ICD-10-CM

## 2024-02-28 DIAGNOSIS — R73.03 PREDIABETES.: ICD-10-CM

## 2024-02-28 DIAGNOSIS — Z00.00 ENCOUNTER FOR GENERAL ADULT MEDICAL EXAMINATION W/OUT ABNORMAL FINDINGS: ICD-10-CM

## 2024-02-28 PROCEDURE — 99396 PREV VISIT EST AGE 40-64: CPT

## 2024-02-28 PROCEDURE — 36415 COLL VENOUS BLD VENIPUNCTURE: CPT

## 2024-02-28 RX ORDER — POLYETHYLENE GLYCOL 3350 17 G/17G
17 POWDER, FOR SOLUTION ORAL DAILY
Qty: 1 | Refills: 2 | Status: ACTIVE | COMMUNITY
Start: 2024-02-28 | End: 1900-01-01

## 2024-02-28 NOTE — ASSESSMENT
[FreeTextEntry1] : Refusing vaccinations today, risk and benefits explained at, still refusing Pap smear up-to-date

## 2024-02-28 NOTE — HISTORY OF PRESENT ILLNESS
[de-identified] : This is a 48-year-old female with past medical history of epicondylitis and prediabetes who comes to establish care.  Today complains of constipation, chronic issue, has been like that for years she has a bowel movement 2-3 times a week.  No other complaints

## 2024-02-28 NOTE — PHYSICAL EXAM
[de-identified] : Obese [Normal] : no carotid or abdominal bruits heard, no varicosities, pedal pulses are present, no peripheral edema, no extremity clubbing or cyanosis and no palpable aorta

## 2024-02-28 NOTE — REVIEW OF SYSTEMS
[Abdominal Pain] : no abdominal pain [Nausea] : no nausea [Constipation] : constipation [Diarrhea] : diarrhea [Heartburn] : no heartburn [Vomiting] : no vomiting [Negative] : Respiratory

## 2024-02-29 DIAGNOSIS — E55.9 VITAMIN D DEFICIENCY, UNSPECIFIED: ICD-10-CM

## 2024-02-29 LAB
25(OH)D3 SERPL-MCNC: 16.1 NG/ML
ALBUMIN SERPL ELPH-MCNC: 4.1 G/DL
ALP BLD-CCNC: 42 U/L
ALT SERPL-CCNC: 12 U/L
ANION GAP SERPL CALC-SCNC: 11 MMOL/L
AST SERPL-CCNC: 13 U/L
BASOPHILS # BLD AUTO: 0.02 K/UL
BASOPHILS NFR BLD AUTO: 0.4 %
BILIRUB SERPL-MCNC: 0.6 MG/DL
BUN SERPL-MCNC: 20 MG/DL
CALCIUM SERPL-MCNC: 9.1 MG/DL
CHLORIDE SERPL-SCNC: 101 MMOL/L
CHOLEST SERPL-MCNC: 135 MG/DL
CO2 SERPL-SCNC: 23 MMOL/L
CREAT SERPL-MCNC: 0.69 MG/DL
EGFR: 108 ML/MIN/1.73M2
EOSINOPHIL # BLD AUTO: 0.06 K/UL
EOSINOPHIL NFR BLD AUTO: 1.2 %
ESTIMATED AVERAGE GLUCOSE: 117 MG/DL
GLUCOSE SERPL-MCNC: 117 MG/DL
HBA1C MFR BLD HPLC: 5.7 %
HCT VFR BLD CALC: 36.3 %
HDLC SERPL-MCNC: 49 MG/DL
HGB BLD-MCNC: 12.2 G/DL
IMM GRANULOCYTES NFR BLD AUTO: 0.4 %
LDLC SERPL CALC-MCNC: 68 MG/DL
LYMPHOCYTES # BLD AUTO: 2.03 K/UL
LYMPHOCYTES NFR BLD AUTO: 40.8 %
MAN DIFF?: NORMAL
MCHC RBC-ENTMCNC: 32 PG
MCHC RBC-ENTMCNC: 33.6 GM/DL
MCV RBC AUTO: 95.3 FL
MONOCYTES # BLD AUTO: 0.42 K/UL
MONOCYTES NFR BLD AUTO: 8.5 %
NEUTROPHILS # BLD AUTO: 2.42 K/UL
NEUTROPHILS NFR BLD AUTO: 48.7 %
NONHDLC SERPL-MCNC: 86 MG/DL
PLATELET # BLD AUTO: 272 K/UL
POTASSIUM SERPL-SCNC: 4 MMOL/L
PROT SERPL-MCNC: 7.6 G/DL
RBC # BLD: 3.81 M/UL
RBC # FLD: 12.4 %
SODIUM SERPL-SCNC: 136 MMOL/L
TRIGL SERPL-MCNC: 95 MG/DL
WBC # FLD AUTO: 4.97 K/UL

## 2024-02-29 RX ORDER — ERGOCALCIFEROL 1.25 MG/1
1.25 MG CAPSULE, LIQUID FILLED ORAL
Qty: 12 | Refills: 1 | Status: ACTIVE | COMMUNITY
Start: 2024-02-29 | End: 1900-01-01

## 2024-06-13 ENCOUNTER — APPOINTMENT (OUTPATIENT)
Dept: GASTROENTEROLOGY | Facility: CLINIC | Age: 49
End: 2024-06-13
Payer: COMMERCIAL

## 2024-06-13 VITALS
OXYGEN SATURATION: 98 % | TEMPERATURE: 97.2 F | RESPIRATION RATE: 16 BRPM | SYSTOLIC BLOOD PRESSURE: 126 MMHG | BODY MASS INDEX: 31.65 KG/M2 | HEART RATE: 55 BPM | HEIGHT: 59 IN | WEIGHT: 157 LBS | DIASTOLIC BLOOD PRESSURE: 80 MMHG

## 2024-06-13 DIAGNOSIS — E66.9 OBESITY, UNSPECIFIED: ICD-10-CM

## 2024-06-13 DIAGNOSIS — Z12.11 ENCOUNTER FOR SCREENING FOR MALIGNANT NEOPLASM OF COLON: ICD-10-CM

## 2024-06-13 DIAGNOSIS — K59.00 CONSTIPATION, UNSPECIFIED: ICD-10-CM

## 2024-06-13 DIAGNOSIS — R14.2 ERUCTATION: ICD-10-CM

## 2024-06-13 PROCEDURE — 99205 OFFICE O/P NEW HI 60 MIN: CPT

## 2024-06-13 RX ORDER — POLYETHYLENE GLYCOL 3350 AND ELECTROLYTES WITH LEMON FLAVOR 236; 22.74; 6.74; 5.86; 2.97 G/4L; G/4L; G/4L; G/4L; G/4L
236 POWDER, FOR SOLUTION ORAL
Qty: 1 | Refills: 0 | Status: ACTIVE | COMMUNITY
Start: 2024-06-13 | End: 1900-01-01

## 2024-06-13 NOTE — ASSESSMENT
[FreeTextEntry1] : 48F PMHx epicondylitis and prediabetes presenting for colonoscopy screening evaluation.   #Chronic constipation #CRC screening  Here for index colonoscopy evaluation No red flag signs of abdominal pain, unintentional weight loss, bloody or black BMs.  -Will plan for screening colonoscopy screening evaluation with Dr Overton at [facility], Rx for GoLytely sent; R/B/A/L discussed with patient. All questions answered to patient's satisfaction. -Most recent BW reviewed  #Obesity, Class I BMI 31.71  -Encouraged ongoing weight loss efforts with dietary and lifestyle changes -Dietician referral placed  #Belching H pylori breath test today  Kamila Mathur DO Gastroenterology Fellow

## 2024-06-13 NOTE — PHYSICAL EXAM
[Alert] : alert [Normal Voice/Communication] : normal voice/communication [Healthy Appearing] : healthy appearing [No Acute Distress] : no acute distress [Sclera] : the sclera and conjunctiva were normal [Hearing Threshold Finger Rub Not [place]] : hearing was normal [Normal Lips/Gums] : the lips and gums were normal [Oropharynx] : the oropharynx was normal [Normal Appearance] : the appearance of the neck was normal [No Neck Mass] : no neck mass was observed [No Respiratory Distress] : no respiratory distress [No Acc Muscle Use] : no accessory muscle use [Respiration, Rhythm And Depth] : normal respiratory rhythm and effort [Abdomen Tenderness] : non-tender [No Masses] : no abdominal mass palpated [Abdomen Soft] : soft [Cervical Lymph Nodes Enlarged Posterior Bilaterally] : no posterior cervical lymphadenopathy [Supraclavicular Lymph Nodes Enlarged Bilaterally] : no supraclavicular lymphadenopathy [Axillary Lymph Nodes Enlarged Bilaterally] : no axillary lymphadenopathy [No CVA Tenderness] : no CVA  tenderness [No Spinal Tenderness] : no spinal tenderness [Abnormal Walk] : normal gait [No Clubbing, Cyanosis] : no clubbing or cyanosis of the fingernails [No Joint Swelling] : no joint swelling seen [Normal Color / Pigmentation] : normal skin color and pigmentation [] : no rash [Cranial Nerves Intact] : cranial nerves 2-12 were intact [Motor Exam] : the motor exam was normal [Oriented To Time, Place, And Person] : oriented to person, place, and time

## 2024-06-13 NOTE — REVIEW OF SYSTEMS
[Recent Weight Gain (___ Lbs)] : recent [unfilled] ~Ulb weight gain [As Noted in HPI] : as noted in HPI [Constipation] : constipation [Negative] : Heme/Lymph [Abdominal Pain] : no abdominal pain [Vomiting] : no vomiting [Diarrhea] : no diarrhea [Heartburn] : no heartburn [Melena (black stool)] : no melena [Bleeding] : no bleeding [Fecal Incontinence (soiling)] : no fecal incontinence [Bloating (gassiness)] : no bloating [FreeTextEntry7] : belching (intermittent)

## 2024-06-13 NOTE — HISTORY OF PRESENT ILLNESS
[FreeTextEntry1] : HPI- 48F PMHx epicondylitis and prediabetes presenting for colonoscopy screening evaluation.   With history of constipation for many years, has a BM 2-3x/week. Denies any bloody BMs or straining with bowel movements. For the most part, she has bristol 4 stools.  Has been prescribed miralax before but doesn't take any thing regularly.  ROS negative for abdominal pain, unintentional weight loss.   States her weight has fluctuated over the years. Noting she has approached it more conservatively with diet. Tries to walk approx 2 miles per day but otherwise doesn't exercise regularly. Not interested in weight loss medications to facilitate weight loss and is constantly working on her nutrition. Would be interested to see a dietician.   Also reports off and on belching for years. No nausea/vomiting, reflux. No prior EGD.    Hgb 12.2 (24) A1c 5.7% (24) Vit D 16.1 (24) TB 0.6 (24) Alk phos 42 (24) AST 13 (24) ALT 12 (24)  Referred by - Dr Mark Anthony Matias (PCP)  PMHx - epicondylitis and prediabetes PSHx -  Rx - none Supplements/herbs/OTC - none A/C or NSAIDs? - sparingly FMHx - mother - HTN, sister - CHF; paternal aunt - GIST (65); no CRC; on father's side, with breast cancer Allergies - NKDA EtOH - social  Smoking - never smoker Drugs - denies  Works in [employer] office for [employer]  EGD - no prior Colonoscopy - no prior

## 2024-06-13 NOTE — END OF VISIT
[] : Fellow [FreeTextEntry3] : 48F with a h/o epicondylitis and prediabetes presenting for colonoscopy screening evaluation. Fiber, hydration, miralax for constipation. Colonoscopy with golytely at [facility]. HP breath test. Refer for nutritional optimization in weight loss attempts.   Alexandro Overton MD Gastroenterology  [Time Spent: ___ minutes] : I have spent [unfilled] minutes of time on the encounter.

## 2024-06-14 ENCOUNTER — APPOINTMENT (OUTPATIENT)
Dept: DERMATOLOGY | Facility: CLINIC | Age: 49
End: 2024-06-14

## 2024-06-17 LAB — UREA BREATH TEST QL: NEGATIVE

## 2024-07-03 ENCOUNTER — APPOINTMENT (OUTPATIENT)
Dept: INTERNAL MEDICINE | Facility: CLINIC | Age: 49
End: 2024-07-03
Payer: COMMERCIAL

## 2024-07-03 VITALS
HEIGHT: 59 IN | TEMPERATURE: 97.3 F | HEART RATE: 72 BPM | BODY MASS INDEX: 30.84 KG/M2 | DIASTOLIC BLOOD PRESSURE: 77 MMHG | WEIGHT: 153 LBS | SYSTOLIC BLOOD PRESSURE: 119 MMHG | OXYGEN SATURATION: 97 %

## 2024-07-03 DIAGNOSIS — K59.00 CONSTIPATION, UNSPECIFIED: ICD-10-CM

## 2024-07-03 DIAGNOSIS — Z12.39 ENCOUNTER FOR OTHER SCREENING FOR MALIGNANT NEOPLASM OF BREAST: ICD-10-CM

## 2024-07-03 DIAGNOSIS — R73.03 PREDIABETES.: ICD-10-CM

## 2024-07-03 DIAGNOSIS — E66.9 OBESITY, UNSPECIFIED: ICD-10-CM

## 2024-07-03 DIAGNOSIS — Z00.00 ENCOUNTER FOR GENERAL ADULT MEDICAL EXAMINATION W/OUT ABNORMAL FINDINGS: ICD-10-CM

## 2024-07-03 PROCEDURE — 99215 OFFICE O/P EST HI 40 MIN: CPT

## 2024-07-03 PROCEDURE — G2211 COMPLEX E/M VISIT ADD ON: CPT

## 2024-08-13 ENCOUNTER — APPOINTMENT (OUTPATIENT)
Age: 49
End: 2024-08-13
Payer: COMMERCIAL

## 2024-08-13 PROCEDURE — 45378 DIAGNOSTIC COLONOSCOPY: CPT

## 2024-08-26 DIAGNOSIS — M54.2 CERVICALGIA: ICD-10-CM

## 2024-08-26 RX ORDER — TIZANIDINE 4 MG/1
4 TABLET ORAL 3 TIMES DAILY
Qty: 40 | Refills: 2 | Status: ACTIVE | COMMUNITY
Start: 2024-08-26 | End: 1900-01-01

## 2024-10-15 ENCOUNTER — APPOINTMENT (OUTPATIENT)
Dept: ENDOCRINOLOGY | Facility: CLINIC | Age: 49
End: 2024-10-15
Payer: COMMERCIAL

## 2024-10-15 VITALS
BODY MASS INDEX: 32.66 KG/M2 | DIASTOLIC BLOOD PRESSURE: 79 MMHG | HEART RATE: 56 BPM | WEIGHT: 162 LBS | HEIGHT: 59 IN | SYSTOLIC BLOOD PRESSURE: 126 MMHG

## 2024-10-15 DIAGNOSIS — E66.811 OBESITY, CLASS 1: ICD-10-CM

## 2024-10-15 DIAGNOSIS — E55.9 VITAMIN D DEFICIENCY, UNSPECIFIED: ICD-10-CM

## 2024-10-15 DIAGNOSIS — R73.03 PREDIABETES.: ICD-10-CM

## 2024-10-15 DIAGNOSIS — N92.6 IRREGULAR MENSTRUATION, UNSPECIFIED: ICD-10-CM

## 2024-10-15 PROCEDURE — G2211 COMPLEX E/M VISIT ADD ON: CPT

## 2024-10-15 PROCEDURE — 36415 COLL VENOUS BLD VENIPUNCTURE: CPT

## 2024-10-15 PROCEDURE — 99204 OFFICE O/P NEW MOD 45 MIN: CPT

## 2024-10-15 RX ORDER — ERGOCALCIFEROL 1.25 MG/1
1.25 MG CAPSULE, LIQUID FILLED ORAL
Qty: 10 | Refills: 0 | Status: ACTIVE | COMMUNITY
Start: 2024-10-15 | End: 1900-01-01

## 2024-10-18 LAB
25(OH)D3 SERPL-MCNC: 20.7 NG/ML
ESTRADIOL SERPL-MCNC: 284 PG/ML
FSH SERPL-MCNC: 6.3 IU/L
HCG SERPL-MCNC: <1 MIU/ML
LH SERPL-ACNC: 34.4 IU/L
PROLACTIN SERPL-MCNC: 14.2 NG/ML
T4 FREE SERPL-MCNC: 1 NG/DL
TSH SERPL-ACNC: 0.92 UIU/ML

## 2025-01-13 ENCOUNTER — APPOINTMENT (OUTPATIENT)
Dept: ENDOCRINOLOGY | Facility: CLINIC | Age: 50
End: 2025-01-13
Payer: COMMERCIAL

## 2025-01-13 VITALS
DIASTOLIC BLOOD PRESSURE: 79 MMHG | SYSTOLIC BLOOD PRESSURE: 118 MMHG | HEIGHT: 59 IN | BODY MASS INDEX: 32.86 KG/M2 | HEART RATE: 54 BPM | WEIGHT: 163 LBS

## 2025-01-13 DIAGNOSIS — E66.811 OBESITY, CLASS 1: ICD-10-CM

## 2025-01-13 DIAGNOSIS — R73.03 PREDIABETES.: ICD-10-CM

## 2025-01-13 DIAGNOSIS — E55.9 VITAMIN D DEFICIENCY, UNSPECIFIED: ICD-10-CM

## 2025-01-13 PROCEDURE — 99214 OFFICE O/P EST MOD 30 MIN: CPT

## 2025-01-13 PROCEDURE — G2211 COMPLEX E/M VISIT ADD ON: CPT

## 2025-01-13 PROCEDURE — 36415 COLL VENOUS BLD VENIPUNCTURE: CPT

## 2025-01-24 LAB
25(OH)D3 SERPL-MCNC: 28.8 NG/ML
ANION GAP SERPL CALC-SCNC: 11 MMOL/L
BUN SERPL-MCNC: 19 MG/DL
CALCIUM SERPL-MCNC: 9.1 MG/DL
CHLORIDE SERPL-SCNC: 100 MMOL/L
CHOLEST SERPL-MCNC: 138 MG/DL
CO2 SERPL-SCNC: 26 MMOL/L
CREAT SERPL-MCNC: 0.77 MG/DL
EGFR: 95 ML/MIN/1.73M2
ESTIMATED AVERAGE GLUCOSE: 117 MG/DL
GLUCOSE SERPL-MCNC: 115 MG/DL
HBA1C MFR BLD HPLC: 5.7 %
HDLC SERPL-MCNC: 43 MG/DL
LDLC SERPL CALC-MCNC: 77 MG/DL
NONHDLC SERPL-MCNC: 95 MG/DL
POTASSIUM SERPL-SCNC: 4.1 MMOL/L
SODIUM SERPL-SCNC: 137 MMOL/L
TRIGL SERPL-MCNC: 92 MG/DL

## 2025-01-30 ENCOUNTER — APPOINTMENT (OUTPATIENT)
Dept: ENDOCRINOLOGY | Facility: CLINIC | Age: 50
End: 2025-01-30
Payer: COMMERCIAL

## 2025-01-30 PROCEDURE — 97802 MEDICAL NUTRITION INDIV IN: CPT

## 2025-03-19 ENCOUNTER — NON-APPOINTMENT (OUTPATIENT)
Age: 50
End: 2025-03-19

## 2025-03-19 ENCOUNTER — APPOINTMENT (OUTPATIENT)
Dept: UROGYNECOLOGY | Facility: CLINIC | Age: 50
End: 2025-03-19
Payer: COMMERCIAL

## 2025-03-19 ENCOUNTER — APPOINTMENT (OUTPATIENT)
Dept: OBGYN | Facility: CLINIC | Age: 50
End: 2025-03-19

## 2025-03-19 VITALS
BODY MASS INDEX: 32.72 KG/M2 | DIASTOLIC BLOOD PRESSURE: 85 MMHG | SYSTOLIC BLOOD PRESSURE: 127 MMHG | HEART RATE: 77 BPM | WEIGHT: 162 LBS | OXYGEN SATURATION: 96 %

## 2025-03-19 DIAGNOSIS — Z01.419 ENCOUNTER FOR GYNECOLOGICAL EXAMINATION (GENERAL) (ROUTINE) W/OUT ABNORMAL FINDINGS: ICD-10-CM

## 2025-03-19 DIAGNOSIS — N89.8 OTHER SPECIFIED NONINFLAMMATORY DISORDERS OF VAGINA: ICD-10-CM

## 2025-03-19 DIAGNOSIS — Z72.51 HIGH RISK HETEROSEXUAL BEHAVIOR: ICD-10-CM

## 2025-03-19 PROCEDURE — 36415 COLL VENOUS BLD VENIPUNCTURE: CPT

## 2025-03-19 PROCEDURE — 99396 PREV VISIT EST AGE 40-64: CPT

## 2025-03-19 PROCEDURE — 99459 PELVIC EXAMINATION: CPT

## 2025-03-20 ENCOUNTER — NON-APPOINTMENT (OUTPATIENT)
Age: 50
End: 2025-03-20

## 2025-03-20 LAB — T PALLIDUM AB SER QL IA: NEGATIVE

## 2025-03-21 ENCOUNTER — NON-APPOINTMENT (OUTPATIENT)
Age: 50
End: 2025-03-21

## 2025-03-21 LAB
BV BACTERIA RRNA VAG QL NAA+PROBE: NOT DETECTED
C GLABRATA RNA VAG QL NAA+PROBE: NOT DETECTED
C TRACH RRNA SPEC QL NAA+PROBE: NOT DETECTED
CANDIDA RRNA VAG QL PROBE: NOT DETECTED
HBV SURFACE AG SER QL: NONREACTIVE
HCV AB SER QL: NONREACTIVE
HCV S/CO RATIO: 0.09 S/CO
HIV1+2 AB SPEC QL IA.RAPID: NONREACTIVE
HPV 16 E6+E7 MRNA CVX QL NAA+PROBE: NOT DETECTED
HPV HIGH+LOW RISK DNA PNL CVX: NOT DETECTED
HPV18+45 E6+E7 MRNA CVX QL NAA+PROBE: NOT DETECTED
N GONORRHOEA RRNA SPEC QL NAA+PROBE: NOT DETECTED
T VAGINALIS RRNA SPEC QL NAA+PROBE: NOT DETECTED

## 2025-03-24 ENCOUNTER — NON-APPOINTMENT (OUTPATIENT)
Age: 50
End: 2025-03-24

## 2025-03-24 ENCOUNTER — TRANSCRIPTION ENCOUNTER (OUTPATIENT)
Age: 50
End: 2025-03-24

## 2025-03-24 LAB — CYTOLOGY CVX/VAG DOC THIN PREP: NORMAL

## 2025-04-14 ENCOUNTER — APPOINTMENT (OUTPATIENT)
Dept: ENDOCRINOLOGY | Facility: CLINIC | Age: 50
End: 2025-04-14
Payer: COMMERCIAL

## 2025-04-14 VITALS
SYSTOLIC BLOOD PRESSURE: 111 MMHG | DIASTOLIC BLOOD PRESSURE: 72 MMHG | WEIGHT: 162 LBS | HEART RATE: 61 BPM | BODY MASS INDEX: 32.72 KG/M2

## 2025-04-14 DIAGNOSIS — R73.03 PREDIABETES.: ICD-10-CM

## 2025-04-14 DIAGNOSIS — E66.811 OBESITY, CLASS 1: ICD-10-CM

## 2025-04-14 DIAGNOSIS — N92.6 IRREGULAR MENSTRUATION, UNSPECIFIED: ICD-10-CM

## 2025-04-14 DIAGNOSIS — R23.2 FLUSHING: ICD-10-CM

## 2025-04-14 DIAGNOSIS — E55.9 VITAMIN D DEFICIENCY, UNSPECIFIED: ICD-10-CM

## 2025-04-14 PROCEDURE — 99214 OFFICE O/P EST MOD 30 MIN: CPT

## 2025-04-14 PROCEDURE — G2211 COMPLEX E/M VISIT ADD ON: CPT

## 2025-04-14 RX ORDER — TIRZEPATIDE 5 MG/.5ML
5 INJECTION, SOLUTION SUBCUTANEOUS
Qty: 1 | Refills: 2 | Status: ACTIVE | COMMUNITY
Start: 2025-04-14 | End: 2025-07-13

## 2025-04-14 RX ORDER — TIRZEPATIDE 2.5 MG/.5ML
2.5 INJECTION, SOLUTION SUBCUTANEOUS
Qty: 1 | Refills: 0 | Status: ACTIVE | COMMUNITY
Start: 2025-04-14 | End: 1900-01-01

## 2025-04-16 ENCOUNTER — APPOINTMENT (OUTPATIENT)
Dept: ENDOCRINOLOGY | Facility: CLINIC | Age: 50
End: 2025-04-16
Payer: COMMERCIAL

## 2025-04-16 PROCEDURE — 97803 MED NUTRITION INDIV SUBSEQ: CPT | Mod: 95

## 2025-04-25 ENCOUNTER — APPOINTMENT (OUTPATIENT)
Dept: DERMATOLOGY | Facility: CLINIC | Age: 50
End: 2025-04-25
Payer: COMMERCIAL

## 2025-04-25 DIAGNOSIS — Q83.3 ACCESSORY NIPPLE: ICD-10-CM

## 2025-04-25 DIAGNOSIS — Z12.83 ENCOUNTER FOR SCREENING FOR MALIGNANT NEOPLASM OF SKIN: ICD-10-CM

## 2025-04-25 DIAGNOSIS — D22.9 MELANOCYTIC NEVI, UNSPECIFIED: ICD-10-CM

## 2025-04-25 PROCEDURE — 99203 OFFICE O/P NEW LOW 30 MIN: CPT

## 2025-05-10 ENCOUNTER — NON-APPOINTMENT (OUTPATIENT)
Age: 50
End: 2025-05-10

## 2025-05-12 ENCOUNTER — TRANSCRIPTION ENCOUNTER (OUTPATIENT)
Age: 50
End: 2025-05-12

## 2025-05-13 ENCOUNTER — TRANSCRIPTION ENCOUNTER (OUTPATIENT)
Age: 50
End: 2025-05-13

## 2025-05-13 ENCOUNTER — NON-APPOINTMENT (OUTPATIENT)
Age: 50
End: 2025-05-13

## 2025-05-13 ENCOUNTER — APPOINTMENT (OUTPATIENT)
Dept: INTERNAL MEDICINE | Facility: CLINIC | Age: 50
End: 2025-05-13

## 2025-05-13 ENCOUNTER — OUTPATIENT (OUTPATIENT)
Dept: OUTPATIENT SERVICES | Facility: HOSPITAL | Age: 50
LOS: 1 days | End: 2025-05-13

## 2025-05-14 ENCOUNTER — TRANSCRIPTION ENCOUNTER (OUTPATIENT)
Age: 50
End: 2025-05-14

## 2025-05-22 ENCOUNTER — TRANSCRIPTION ENCOUNTER (OUTPATIENT)
Age: 50
End: 2025-05-22

## 2025-06-09 ENCOUNTER — TRANSCRIPTION ENCOUNTER (OUTPATIENT)
Age: 50
End: 2025-06-09

## 2025-06-12 ENCOUNTER — TRANSCRIPTION ENCOUNTER (OUTPATIENT)
Age: 50
End: 2025-06-12

## 2025-06-18 ENCOUNTER — APPOINTMENT (OUTPATIENT)
Dept: UROGYNECOLOGY | Facility: CLINIC | Age: 50
End: 2025-06-18
Payer: COMMERCIAL

## 2025-06-18 VITALS
OXYGEN SATURATION: 97 % | WEIGHT: 158 LBS | SYSTOLIC BLOOD PRESSURE: 114 MMHG | DIASTOLIC BLOOD PRESSURE: 76 MMHG | HEART RATE: 62 BPM | BODY MASS INDEX: 31.91 KG/M2

## 2025-06-18 PROCEDURE — 99213 OFFICE O/P EST LOW 20 MIN: CPT

## 2025-06-18 PROCEDURE — 99459 PELVIC EXAMINATION: CPT

## 2025-06-19 ENCOUNTER — NON-APPOINTMENT (OUTPATIENT)
Age: 50
End: 2025-06-19

## 2025-06-19 LAB
ANTI-MUELLERIAN HORMONE: 0.09 NG/ML
ESTRADIOL SERPL-MCNC: 61 PG/ML
FSH SERPL-MCNC: 4.6 IU/L
HCG SERPL-MCNC: <1 MIU/ML
LH SERPL-ACNC: 2.7 IU/L
PROLACTIN SERPL-MCNC: 10.7 NG/ML
TSH SERPL-ACNC: 0.43 UIU/ML

## 2025-06-20 PROBLEM — N95.1 PERIMENOPAUSE: Status: ACTIVE | Noted: 2025-06-20

## 2025-07-16 ENCOUNTER — APPOINTMENT (OUTPATIENT)
Dept: ENDOCRINOLOGY | Facility: CLINIC | Age: 50
End: 2025-07-16
Payer: COMMERCIAL

## 2025-07-16 VITALS
HEART RATE: 57 BPM | SYSTOLIC BLOOD PRESSURE: 112 MMHG | DIASTOLIC BLOOD PRESSURE: 72 MMHG | WEIGHT: 152 LBS | HEIGHT: 59 IN | BODY MASS INDEX: 30.64 KG/M2

## 2025-07-16 LAB — HBA1C MFR BLD HPLC: 5.3

## 2025-07-16 PROCEDURE — G2211 COMPLEX E/M VISIT ADD ON: CPT

## 2025-07-16 PROCEDURE — 83036 HEMOGLOBIN GLYCOSYLATED A1C: CPT | Mod: QW

## 2025-07-16 PROCEDURE — 99213 OFFICE O/P EST LOW 20 MIN: CPT

## 2025-09-02 ENCOUNTER — TRANSCRIPTION ENCOUNTER (OUTPATIENT)
Age: 50
End: 2025-09-02

## 2025-09-05 ENCOUNTER — TRANSCRIPTION ENCOUNTER (OUTPATIENT)
Age: 50
End: 2025-09-05

## 2025-09-10 ENCOUNTER — APPOINTMENT (OUTPATIENT)
Dept: UROGYNECOLOGY | Facility: CLINIC | Age: 50
End: 2025-09-10
Payer: COMMERCIAL

## 2025-09-10 VITALS
BODY MASS INDEX: 28.88 KG/M2 | HEART RATE: 85 BPM | DIASTOLIC BLOOD PRESSURE: 85 MMHG | SYSTOLIC BLOOD PRESSURE: 126 MMHG | WEIGHT: 143 LBS | OXYGEN SATURATION: 98 %

## 2025-09-10 DIAGNOSIS — N76.0 ACUTE VAGINITIS: ICD-10-CM

## 2025-09-10 DIAGNOSIS — L29.3 ANOGENITAL PRURITUS, UNSPECIFIED: ICD-10-CM

## 2025-09-10 PROCEDURE — 56605 BIOPSY OF VULVA/PERINEUM: CPT

## 2025-09-10 PROCEDURE — 99459 PELVIC EXAMINATION: CPT

## 2025-09-10 PROCEDURE — 99214 OFFICE O/P EST MOD 30 MIN: CPT | Mod: 25

## 2025-09-15 ENCOUNTER — TRANSCRIPTION ENCOUNTER (OUTPATIENT)
Age: 50
End: 2025-09-15

## 2025-09-15 DIAGNOSIS — N90.4 LEUKOPLAKIA OF VULVA: ICD-10-CM

## 2025-09-15 LAB
BV BACTERIA RRNA VAG QL NAA+PROBE: DETECTED
C GLABRATA RNA VAG QL NAA+PROBE: NOT DETECTED
CANDIDA RRNA VAG QL PROBE: NOT DETECTED
CORE LAB BIOPSY: NORMAL
T VAGINALIS RRNA SPEC QL NAA+PROBE: NOT DETECTED

## 2025-09-15 RX ORDER — METRONIDAZOLE 500 MG/1
500 TABLET ORAL TWICE DAILY
Qty: 14 | Refills: 0 | Status: ACTIVE | COMMUNITY
Start: 2025-09-15 | End: 1900-01-01

## 2025-09-16 ENCOUNTER — TRANSCRIPTION ENCOUNTER (OUTPATIENT)
Age: 50
End: 2025-09-16

## 2025-09-19 ENCOUNTER — TRANSCRIPTION ENCOUNTER (OUTPATIENT)
Age: 50
End: 2025-09-19